# Patient Record
Sex: MALE | HISPANIC OR LATINO | Employment: FULL TIME | URBAN - METROPOLITAN AREA
[De-identification: names, ages, dates, MRNs, and addresses within clinical notes are randomized per-mention and may not be internally consistent; named-entity substitution may affect disease eponyms.]

---

## 2021-06-07 ENCOUNTER — NON-PROVIDER VISIT (OUTPATIENT)
Dept: URGENT CARE | Facility: PHYSICIAN GROUP | Age: 26
End: 2021-06-07

## 2021-06-07 DIAGNOSIS — Z02.1 PRE-EMPLOYMENT DRUG SCREENING: ICD-10-CM

## 2021-06-07 LAB
AMP AMPHETAMINE: NORMAL
COC COCAINE: NORMAL
INT CON NEG: NEGATIVE
INT CON POS: POSITIVE
MET METHAMPHETAMINES: NORMAL
OPI OPIATES: NORMAL
PCP PHENCYCLIDINE: NORMAL
POC DRUG COMMENT 753798-OCCUPATIONAL HEALTH: NORMAL
THC: NORMAL

## 2021-06-07 PROCEDURE — 80305 DRUG TEST PRSMV DIR OPT OBS: CPT | Performed by: FAMILY MEDICINE

## 2021-09-20 ENCOUNTER — NON-PROVIDER VISIT (OUTPATIENT)
Dept: URGENT CARE | Facility: PHYSICIAN GROUP | Age: 26
End: 2021-09-20

## 2021-09-20 DIAGNOSIS — Z02.1 PRE-EMPLOYMENT DRUG SCREENING: ICD-10-CM

## 2021-09-20 LAB
AMP AMPHETAMINE: NORMAL
COC COCAINE: NORMAL
INT CON NEG: NEGATIVE
INT CON POS: NORMAL
MET METHAMPHETAMINES: NORMAL
OPI OPIATES: NORMAL
PCP PHENCYCLIDINE: NORMAL
POC DRUG COMMENT 753798-OCCUPATIONAL HEALTH: NORMAL
THC: NORMAL

## 2021-09-20 PROCEDURE — 80305 DRUG TEST PRSMV DIR OPT OBS: CPT | Performed by: PHYSICIAN ASSISTANT

## 2022-03-07 ENCOUNTER — OFFICE VISIT (OUTPATIENT)
Dept: URGENT CARE | Facility: CLINIC | Age: 27
End: 2022-03-07

## 2022-03-07 VITALS
DIASTOLIC BLOOD PRESSURE: 82 MMHG | HEIGHT: 65 IN | RESPIRATION RATE: 12 BRPM | HEART RATE: 106 BPM | SYSTOLIC BLOOD PRESSURE: 128 MMHG | BODY MASS INDEX: 33.99 KG/M2 | WEIGHT: 204 LBS | TEMPERATURE: 98.1 F | OXYGEN SATURATION: 99 %

## 2022-03-07 DIAGNOSIS — Z02.89 ENCOUNTER FOR EXAMINATION REQUIRED BY DEPARTMENT OF TRANSPORTATION (DOT): ICD-10-CM

## 2022-03-07 PROCEDURE — 7100 PR DOT PHYSICAL: Performed by: FAMILY MEDICINE

## 2022-03-08 NOTE — PROGRESS NOTES
Patient is a healthy 26-year-old male with no chronic medical problems and no daily medications.  He presents for DOT physical.  Has a normal physical exam and is cleared for 2-year certificate.  Please see scanned forms in chart.

## 2024-08-09 ENCOUNTER — OFFICE VISIT (OUTPATIENT)
Dept: URGENT CARE | Facility: CLINIC | Age: 29
End: 2024-08-09
Payer: COMMERCIAL

## 2024-08-09 VITALS
DIASTOLIC BLOOD PRESSURE: 82 MMHG | OXYGEN SATURATION: 95 % | SYSTOLIC BLOOD PRESSURE: 142 MMHG | HEIGHT: 66 IN | HEART RATE: 87 BPM | RESPIRATION RATE: 18 BRPM | TEMPERATURE: 97.3 F | BODY MASS INDEX: 33.75 KG/M2 | WEIGHT: 210 LBS

## 2024-08-09 DIAGNOSIS — R04.0 LEFT-SIDED EPISTAXIS: ICD-10-CM

## 2024-08-09 DIAGNOSIS — Z87.898 HISTORY OF EPISTAXIS: ICD-10-CM

## 2024-08-09 PROCEDURE — 30901 CONTROL OF NOSEBLEED: CPT | Mod: LT

## 2024-08-09 PROCEDURE — 99213 OFFICE O/P EST LOW 20 MIN: CPT | Mod: 25

## 2024-08-09 ASSESSMENT — ENCOUNTER SYMPTOMS
VOMITING: 0
DIARRHEA: 0
NAUSEA: 0
FEVER: 0
SORE THROAT: 0
COUGH: 0
CHILLS: 0
HEADACHES: 0

## 2024-08-09 NOTE — PROGRESS NOTES
"Subjective:   Papito Emmanuel is a 29 y.o. male who presents for Epistaxis (Constant nose bleeds , has been happening almost every day )      Patient presents with complaints of left nare epistaxis that has been going on for the last 15 to 20 minutes.  Patient states that he has been having an increase in the amount of nosebleeds that has been having, though today he was unable to achieve hemostasis.  He denies any medication use, any inhalant use, any blood thinners currently, he denies any trauma to the affected area.  As far as he knows he states he is not have any bleeding disorder  or sinus issues    Epistaxis   The bleeding has been from the left nare. This is a recurrent problem. The current episode started today. The problem occurs constantly. The problem has been gradually worsening. The bleeding is associated with dry air. He has tried pressure for the symptoms. The treatment provided mild relief. His past medical history is significant for frequent nosebleeds. There is no history of allergies, a bleeding disorder, colds or sinus problems.       Review of Systems   Constitutional:  Negative for chills and fever.   HENT:  Positive for nosebleeds. Negative for congestion and sore throat.    Respiratory:  Negative for cough.    Cardiovascular:  Negative for chest pain.   Gastrointestinal:  Negative for diarrhea, nausea and vomiting.   Skin:  Negative for rash.   Neurological:  Negative for headaches.       Medications, Allergies, and current problem list reviewed today in Epic.     Objective:     BP (!) 142/82 (BP Location: Left arm, Patient Position: Sitting, BP Cuff Size: Large adult)   Pulse 87   Temp 36.3 °C (97.3 °F) (Temporal)   Resp 18   Ht 1.676 m (5' 6\")   Wt 95.3 kg (210 lb)   SpO2 95%     Physical Exam  Vitals reviewed.   Constitutional:       General: He is not in acute distress.     Appearance: Normal appearance. He is not ill-appearing or diaphoretic.   HENT:      Head: Normocephalic " and atraumatic.      Nose: No nasal deformity, laceration or nasal tenderness.      Right Nostril: No foreign body, epistaxis, septal hematoma or occlusion.      Left Nostril: Epistaxis present. No foreign body, septal hematoma or occlusion.      Right Sinus: No frontal sinus tenderness.        Mouth/Throat:      Mouth: Mucous membranes are moist.      Pharynx: Oropharynx is clear.   Eyes:      General: No scleral icterus.     Conjunctiva/sclera: Conjunctivae normal.      Pupils: Pupils are equal, round, and reactive to light.   Cardiovascular:      Rate and Rhythm: Normal rate and regular rhythm.      Pulses: Normal pulses.      Heart sounds: Normal heart sounds.   Pulmonary:      Effort: Pulmonary effort is normal.      Breath sounds: Normal breath sounds.   Abdominal:      General: Abdomen is flat. Bowel sounds are normal. There is no distension.      Palpations: Abdomen is soft. There is no mass.      Tenderness: There is no abdominal tenderness. There is no right CVA tenderness, left CVA tenderness, guarding or rebound.      Hernia: No hernia is present.   Musculoskeletal:         General: Normal range of motion.   Skin:     General: Skin is warm and dry.      Capillary Refill: Capillary refill takes less than 2 seconds.      Coloration: Skin is not jaundiced or pale.   Neurological:      Mental Status: He is alert.         Assessment/Plan:     Diagnosis and associated orders:     1. Left-sided epistaxis  Referral to establish with PCP    Referral to ENT      2. History of epistaxis  Referral to establish with PCP    Referral to ENT         Comments/MDM:     Patient history and physical examination are consistent with an acute epistaxis of the left nare.  Oxymetazoline spray was instilled into affected nare, and pressure was applied for 10 minutes with no breaks.  Hemostasis was achieved.  Discussed extensively with the patient to try and identify triggers that worsen or cause nosebleeds such as dry  air  Discussed doing referral to PCP and ENT for evaluation since this is an ongoing and recurrent problem for patient.  Referral to ENT placed  Referral to establish primary care placed  ER precautions given to patient that if he notices any increase or recurrence of bleeding that he is unable to control within 15 minutes of vigorous pressure, please go to the ER.  Patient understands plan of care and the follow-up         Differential diagnosis, natural history, supportive care, and indications for immediate follow-up discussed.    Advised the patient to follow-up with the primary care physician for recheck, reevaluation, and consideration of further management.    Please note that this dictation was created using voice recognition software. I have made a reasonable attempt to correct obvious errors, but I expect that there are errors of grammar and possibly content that I did not discover before finalizing the note.    This note was electronically signed by DEBORAH Brown

## 2025-02-07 ENCOUNTER — TELEPHONE (OUTPATIENT)
Dept: HEALTH INFORMATION MANAGEMENT | Facility: OTHER | Age: 30
End: 2025-02-07
Payer: COMMERCIAL